# Patient Record
Sex: FEMALE | Race: WHITE | NOT HISPANIC OR LATINO | URBAN - METROPOLITAN AREA
[De-identification: names, ages, dates, MRNs, and addresses within clinical notes are randomized per-mention and may not be internally consistent; named-entity substitution may affect disease eponyms.]

---

## 2024-11-08 ENCOUNTER — CLINICAL SUPPORT (OUTPATIENT)
Dept: AUDIOLOGY | Facility: CLINIC | Age: 64
End: 2024-11-08
Payer: COMMERCIAL

## 2024-11-08 DIAGNOSIS — H93.13 SUBJECTIVE TINNITUS OF BOTH EARS: ICD-10-CM

## 2024-11-08 DIAGNOSIS — H90.3 SENSORINEURAL HEARING LOSS (SNHL) OF BOTH EARS: Primary | ICD-10-CM

## 2024-11-08 PROCEDURE — 92557 COMPREHENSIVE HEARING TEST: CPT | Performed by: AUDIOLOGIST

## 2024-11-08 PROCEDURE — 92550 TYMPANOMETRY & REFLEX THRESH: CPT | Performed by: AUDIOLOGIST

## 2024-11-08 NOTE — PROGRESS NOTES
Name: Dorota Taylor  YOB: 1960  Age: 63 y.o.    Date of Evaluation:  11/08/2024   Dorota Taylor is seen today for an evaluation of hearing prior to an appointment with Kaylee Thompson CNP.  Dorota reports a history of bilateral, constant bothersome tinnitus that has progressively gotten worse over the last few years. She reports the tinnitus interferes with her daily life and her ability to communicate and understand others. She denies vertigo and ear pain. She reports the tinnitus causes her difficulty hearing.     Evaluation:  Otoscopy revealed clear canals with visible tympanic membranes bilaterally.    Immittance:  Right: Type A middle ear function with normal compliance, peak pressure, and ear canal volume.  Left: Type A middle ear function with normal compliance, peak pressure, and ear canal volume.    Ipsilateral Acoustic Reflexes:  Right: Present 500-2000 Hz. Absent 4000 Hz  Left: Present 500-2000 Hz. Absent 4000 Hz    Behavioral Audiometry:  Right: normal hearing sloping to mild sensorineural hearing loss 125-8000 Hz. Excellent word understanding (96 %) at 60 dB HL.  Left:  normal hearing sloping to mild sensorineural hearing loss 125-8000 Hz. Excellent word understanding (100 %) at 60 dB HL.    Pure tone averages in agreement with speech reception thresholds.    Tinnitus Pitch Match = 11,200 Hz  Tinnitus Loudness Match = 54 dB  Right Threshold at 11,2000 Hz = 30 dB HL  Left Threshold at 11,2000 Hz = 35 dB HL    Results:  Today's results were discussed with the patient indicating normal hearing sloping to a mild sensorineural hearing loss bilaterally. Normal tympanograms and excellent word understanding bilaterally.    Treatment Plan:  Follow-up with referring provider  Retest hearing in conjunction with medical management or if a change in hearing occurs    Time: 7165-1647    Completed by:  Jose Mathews, CCC-A  Licensed Senior Audiologist

## 2024-11-14 ENCOUNTER — APPOINTMENT (OUTPATIENT)
Dept: OTOLARYNGOLOGY | Facility: CLINIC | Age: 64
End: 2024-11-14
Payer: COMMERCIAL

## 2024-11-14 VITALS
SYSTOLIC BLOOD PRESSURE: 140 MMHG | HEIGHT: 62 IN | WEIGHT: 204 LBS | DIASTOLIC BLOOD PRESSURE: 83 MMHG | BODY MASS INDEX: 37.54 KG/M2

## 2024-11-14 DIAGNOSIS — H90.3 SENSORINEURAL HEARING LOSS (SNHL) OF BOTH EARS: Primary | ICD-10-CM

## 2024-11-14 DIAGNOSIS — H93.13 TINNITUS OF BOTH EARS: ICD-10-CM

## 2024-11-14 PROCEDURE — 3008F BODY MASS INDEX DOCD: CPT

## 2024-11-14 PROCEDURE — 1036F TOBACCO NON-USER: CPT

## 2024-11-14 PROCEDURE — 99203 OFFICE O/P NEW LOW 30 MIN: CPT

## 2024-11-14 RX ORDER — VIBEGRON 75 MG/1
75 TABLET, FILM COATED ORAL
COMMUNITY
Start: 2024-11-08 | End: 2025-03-08

## 2024-11-14 RX ORDER — BUPROPION HYDROCHLORIDE 150 MG/1
TABLET ORAL
COMMUNITY
Start: 2024-04-06

## 2024-11-14 NOTE — PROGRESS NOTES
Patient ID: Dorota Taylor is a 63 y.o. female who presents for the evaluation of tinnitus.    PROVIDER IMPRESSIONS:  DIAGNOSES/PROBLEMS:  -Bilateral sensorineural hearing loss  -Bilateral tinnitus    ASSESSMENT:   Dorota Taylor is a pleasant 63 y.o. female who presents with symptoms of bilateral non-pulsatile tinnitus. Based on the clinical information provided, symptoms and clinical exam findings are consistent with bilateral sensorineural hearing loss. Reassurance provided to patient that exam today showed no evidence of acute infection or inflammation in the EAC bilaterally and that TM appears with no evidence of infection, effusion, retraction or perforation bilaterally.  Audiogram reviewed in detail with the patient, which revealed mild bilateral sensorineural hearing loss in frequencies above 500 Hz. The various etiologies of tinnitus were reviewed. I explained that subjective tinnitus is often a result of abnormalities within the auditory system that are without an identifiable cause. Patient was informed that no current treatments are clinically proven to provide a definitive cure for primary tinnitus. We discussed that treatment objectives for primary tinnitus aim to reduce the perceived burden and intensity of tinnitus in order to improve quality of life for the patient.     PLAN:  Patient advised to wear ear hearing protection while in the presence of loud sounds. The patient was also counseled to utilize use tinnitus coping strategies as needed, such as sound apps on a smartphone, utilizing calming noise in the room, running a fan at night, etc.    Patient was educated that hearing aids could provide benefit through amplification of sounds that not only correct hearing loss, but also serve as auditory therapy that may make tinnitus less noticeable. Patient may schedule for hearing aid evaluation, if they have not already done so.  Follow-up: Patient may schedule for follow-up as needed.  Patient is agreeable to this plan, all questions were answered to patient's satisfaction.     Subjective   HPI: Dorota Taylor is a 63 y.o. female who presents for the evaluation of tinnitus in both ears.  The patient states that symptom onset began a many years ago and has gradually worsened in the last 2 yrs. Patient describes tinnitus as a continuous non-pulsatile high-pitched ringing sound.  She reports the tinnitus interferes with her daily life and her ability to communicate and understand others and is distracting. She reports that tinnitus causes her difficulty hearing. When asked about the presence of hearing loss, ear pain, ear fullness/pressure, ear itching, ear drainage, autophony, dizziness or vertigo, she admits to bilateral ear fullness. When asked about pertinent otologic history, the patient denies a history of recurrent ear infections, denies history of ear surgery, denies history of PE tube insertion, and denies history of prolonged/traumatic loud noise exposure. The patient does insert Q-tips in the ear canals, and  denies insertion of other foreign objects into the ear canals. The patient denies history of wearing hearing aid devices. The patient does not endorse a family history of hearing loss.       PATIENT HISTORY:  No past medical history on file.   No past surgical history on file.   Not on File   No current outpatient medications on file.   Tobacco Use: Unknown (4/9/2024)    Received from [facility]    Patient History     Smoking Tobacco Use: Never     Smokeless Tobacco Use: Unknown     Passive Exposure: Not on file      Alcohol Use: Not on file      Social History     Substance and Sexual Activity   Drug Use Not on file        Review of Systems   All other systems negative.     Objective   There were no vitals taken for this visit.     PHYSICAL EXAM:  General appearance: Appears well, well-nourished, well groomed. No acute distress.   Constitutional: No fever, chills, weight  loss or weight gain.  Communication: Normal communication  Psychiatric: Oriented to person, place and time. Normal mood and affect.  Neurologic: Cranial nerves II-XII grossly intact and symmetric bilaterally.  Cardiovascular: Examination of peripheral vascular system shows no clubbing or cyanosis.  Respiratory: No respiratory distress increased work of breathing. Inspection of the chest with symmetric chest expansion and normal respiratory effort.  Skin: No head and neck rashes.  Head: Normocephalic. Atraumatic with no masses, lesions or scarring.  Face: Normal symmetry. No scars or deformities.  Eyes: Conjunctiva not edematous or erythematous. PERRLA  Neck: Supple and symmetric, trachea midline. Lymph nodes with no adenopathy.  Head: Normocephalic. Atraumatic with no masses, lesions or scarring.  Eyes: PERRL, EOMI, Conjunctiva is clear. No nystagmus.  Nose: External inspection of nose: No nasal lesions, lacerations or scars. No tenderness on frontal or maxillary sinus palpation. Anterior rhinoscopy with limited visualization past the inferior turbinates: Septum is midline.  No septal perforation or lesions. No septal hematoma/ seroma.  No signs of bleeding.  No evidence of intranasal polyps.     Throat:  Floor of mouth is clear, no masses.  Tongue appears normal, no lesions or masses. Gums, gingiva, buccal mucosa appear pink and moist, no lesions. Teeth are in intact.  No obvious dental infections.  Peritonsillar regions appear symmetric without swelling.  Hard and soft palate appear normal, no obvious cleft. Uvula is midline.  Left Tonsil -- 2+, no exudates.  Right Tonsil -- 2+, no exudates.  Oropharynx: No lesions. Retropharyngeal wall is flat.  []postnasal drip.  Salivary Glands: Symmetric bilaterally.  No palpable masses.  No evidence of acute infection or salivary stones.  TMJ: Mild TMJ crepitus on jaw-opening with bimanual palpation, no trismus.  Right Ear: External inspection of ear with no deformity, scars,  or masses. Mastoid is nontender. External auditory canal is clear.  TM is intact with no sign of infection, effusion, or retraction.  No perforation seen. Auto insufflation visible under microscopy.  Left Ear: External inspection of ear with no deformity, scars, or masses. Mastoid is nontender. External auditory canal is clear.  TM is intact with no sign of infection, effusion, or retraction.  No perforation seen. Auto insufflation visible under microscopy.    RESULTS:  I personally reviewed the patient's audiogram from  11/8/24 , which revealed the following results: Normal hearing in both ears through 500 Hz, sloping to a mild sensorineural hearing loss above. Normal tympanogram bilaterally. Excellent WRS bilaterally. Acoustic reflexes present right ipsilateral, and present left ipsilateral.    Kristin Thompson, APRN-CNP

## 2025-03-11 ENCOUNTER — OFFICE VISIT (OUTPATIENT)
Dept: URGENT CARE | Age: 65
End: 2025-03-11
Payer: COMMERCIAL

## 2025-03-11 VITALS
BODY MASS INDEX: 37.76 KG/M2 | WEIGHT: 200 LBS | DIASTOLIC BLOOD PRESSURE: 74 MMHG | RESPIRATION RATE: 20 BRPM | HEIGHT: 61 IN | SYSTOLIC BLOOD PRESSURE: 131 MMHG | TEMPERATURE: 98.5 F | HEART RATE: 55 BPM | OXYGEN SATURATION: 97 %

## 2025-03-11 DIAGNOSIS — J03.90 ACUTE TONSILLITIS, UNSPECIFIED ETIOLOGY: ICD-10-CM

## 2025-03-11 DIAGNOSIS — J02.9 SORE THROAT: ICD-10-CM

## 2025-03-11 DIAGNOSIS — J06.9 VIRAL UPPER RESPIRATORY TRACT INFECTION: ICD-10-CM

## 2025-03-11 DIAGNOSIS — H65.191 OTHER NON-RECURRENT ACUTE NONSUPPURATIVE OTITIS MEDIA OF RIGHT EAR: Primary | ICD-10-CM

## 2025-03-11 DIAGNOSIS — Z20.822 LAB TEST NEGATIVE FOR COVID-19 VIRUS: ICD-10-CM

## 2025-03-11 LAB
POC BINAX EXPIRATION: NORMAL
POC BINAX NOW COVID SERIAL NUMBER: NORMAL
POC RAPID INFLUENZA A: NEGATIVE
POC RAPID INFLUENZA B: NEGATIVE
POC RAPID STREP: NEGATIVE
POC SARS-COV-2 AG BINAX: NORMAL

## 2025-03-11 RX ORDER — BUPROPION HYDROCHLORIDE 150 MG/1
1 TABLET, EXTENDED RELEASE ORAL 2 TIMES DAILY
COMMUNITY

## 2025-03-11 RX ORDER — VIBEGRON 75 MG/1
1 TABLET, FILM COATED ORAL
COMMUNITY
Start: 2025-02-17

## 2025-03-11 RX ORDER — AZITHROMYCIN 250 MG/1
TABLET, FILM COATED ORAL
Qty: 6 TABLET | Refills: 0 | Status: SHIPPED | OUTPATIENT
Start: 2025-03-11

## 2025-03-11 RX ORDER — BENZONATATE 200 MG/1
200 CAPSULE ORAL 3 TIMES DAILY PRN
Qty: 30 CAPSULE | Refills: 0 | Status: SHIPPED | OUTPATIENT
Start: 2025-03-11 | End: 2025-09-07

## 2025-03-11 ASSESSMENT — PAIN SCALES - GENERAL: PAINLEVEL_OUTOF10: 7

## 2025-03-11 ASSESSMENT — ENCOUNTER SYMPTOMS
FATIGUE: 1
RHINORRHEA: 1
SHORTNESS OF BREATH: 0
COUGH: 1
GASTROINTESTINAL NEGATIVE: 1
WHEEZING: 0
SORE THROAT: 1
TROUBLE SWALLOWING: 0

## 2025-03-11 NOTE — PATIENT INSTRUCTIONS
"You are being treated with antibiotics for tonsillitis and an ear infection. Finish all antibiotics even if you start to feel better.     Tricks to manage your symptoms:   -Drink plenty of water to stay hydrated.  -Get plenty of rest.   -Ibuprofen (Advil® or Motrin®) or acetaminophen (Tylenol®) may be used for temperature and/or discomfort.  Do not exceed the recommended daily amount as written on the bottle. Do not take ibuprofen or other NSAIDS if you are on a blood thinner.  Please contact your provider.  -You can take a daily non-drowsy allergy medication such as Allergra (Fexofendadine), Zyrtec (Cetirizine) or Claritin (Loratadine) to decrease sinus and nasal inflammation and drainage.  -If you have elevated blood pressure you should avoid products with products that contain phenylephrine or pseudophedrine as these medications can increase your blood pressure.  -Putting a small amount of honey in tea may help with your sore throat and cough.  -Gargle with warm salt water 3 to 4 times each day. Mix 1/2 teaspoon (2.5 grams) salt with a cup (240 mL) of warm water.     -Nasal steroids spray (fluticasone or generic equivalent) 1-2 times daily as needed  -Saline nasal spray  -You can take OTC mucinex (Guaifenesin): Can take up to 1200 mg twice daily.    -Drink plenty of water. Water helps thin the mucus so your sinuses can drain more easily.   -Use a humidifier.  -inhale steam 3 to 4 times a day (for example, sit in the bathroom with the shower running).  -Apply a warm, moist washcloth to your face 3 to 4 times a day, or as directed by your caregiver.    SEEK FURTHER MEDICAL ATTENTION IF:  -Shortness of breath or difficulty breathing  -You are unable to take a deep breath  -Trouble swallowing, trouble breathing, or shortness of breath while lying down  -You have difficulties swallowing  -You have difficulties closing your mouth due to pain. (\"HOT POTATO MOUTH\")  -You have chest pain   -You have heart " palpitations  -You have fever > 102 F despite fever reducing medications   -You are unable to tolerate fluids for 6 hours or more  -You develop swelling and/or pain in your legs   -You feel faint, lightheaded, or dizzy  -Any other concerning symptoms    -If your symptoms are not improving over the next 36 hours, please return to the clinic or see your PCP. If your symptoms worsen or you develop shortness of breath, seek emergency care immediately.     Your blood pressure was elevated, please follow up with primary care provider.   If you are not improving or your symptoms worsen go to the ER immediately for evaluation.

## 2025-03-11 NOTE — PROGRESS NOTES
"Subjective   Patient ID: Dorota Taylor is a 64 y.o. female. They present today with a chief complaint of Illness (Earache and sore throat since saturday).    History of Present Illness  -c/o of right ear pain and sore throat since Saturday  -also has cough, body aches  -denies CP, SOB, difficulty swallowing            Past Medical History  Allergies as of 03/11/2025 - Reviewed 03/11/2025   Allergen Reaction Noted    Penicillins Anaphylaxis, Hives, Unknown, and Shortness of breath 06/03/2015       (Not in a hospital admission)       No past medical history on file.    No past surgical history on file.     reports that she has never smoked. She has never used smokeless tobacco.    Review of Systems  Review of Systems   Constitutional:  Positive for fatigue.   HENT:  Positive for ear pain, postnasal drip, rhinorrhea and sore throat. Negative for trouble swallowing.    Respiratory:  Positive for cough. Negative for shortness of breath and wheezing.    Cardiovascular:  Negative for chest pain.   Gastrointestinal: Negative.    All other systems reviewed and are negative.    Objective    Vitals:    03/11/25 1217   BP: 131/74   Pulse: 55   Resp: 20   Temp: 36.9 °C (98.5 °F)   TempSrc: Oral   SpO2: 97%   Weight: 90.7 kg (200 lb)   Height: 1.549 m (5' 1\")     No LMP recorded.    Physical Exam  GEN: Alert, cooperative, NAD, Vitals Reviewed.   SKIN:  No suspicious rashes.  ENT: Right TMs +erythema and bulging, no effusion, TM intact. L TM midly injected.  B/l ear canals unremarkable. No mastoid tenderness. + nasal congestion. No sinus tenderness to fingertap. Oropharyngeal erythema, b/l tonsils hyperemic and significantly swollen, scant exudates. .  + post nasal drip.  Uvula midline. No uvula swelling.  No Cervical LAD.  No submental tenderness.  No evidence of PTA. No mastoid tenderness.   ROM intact.  No thyroid masses.  No trismus. Neck supple.  Trachea midline.  No meningeal signs.  RESP: Unlabored, no increased " WOB, no accessory muscle use, no wheezing, rhonchi, or rales appreciated.   CARDS: RRR, no m/g/r    Procedures    Point of Care Test & Imaging Results from this visit  Results for orders placed or performed in visit on 03/11/25   POCT Covid-19 Rapid Antigen   Result Value Ref Range    Binax NOW Covid Serial Number -     BINAX NOW Covid Expiration -     POC STEPHEN-COV-2 AG  Presumptive negative test for SARS-CoV-2 (no antigen detected)     Presumptive negative test for SARS-CoV-2 (no antigen detected)   POCT Influenza A/B manually resulted   Result Value Ref Range    POC Rapid Influenza A Negative Negative    POC Rapid Influenza B Negative Negative   POCT rapid strep A manually resulted   Result Value Ref Range    POC Rapid Strep Negative Negative      No results found.    Diagnostic study results (if any) were reviewed by Karen Michael PA-C.    Assessment/Plan   Allergies, medications, history, and pertinent labs/EKGs/Imaging reviewed by Karen Michael PA-C.     Medical Decision Making  Rapid strep negative. Rapid COVID negative. Rapid Flu A/B negative.  Clinical exam concerning for tonsillitis without evidence of peritonsillar abscess, deep neck infection or sepsis, also evidence of acute otitis media of the right ear.  Will start on azithromycin (PCN allergy) which will cover both tonsillitis and otitis media..  Encouraged continue symptom management with push fluids, rest, gargle warm salt water, use vaporizer or mist prn, apply heat to sinuses prn, and return office visit prn if symptoms persist or worsen anti-histamines, anti-tussives, anti-mucolytics, and/or flonase etc..  Risks, benefits, and alternatives of the medications and treatment plan prescribed today were discussed, and patient expressed understanding. Plan follow up as discussed or as needed if any worsening symptoms or change in condition. Reinforced red flags including (but not limited to): severe or worsening pain; difficulty swallowing; stiff  neck; shortness of breath; coughing or vomiting blood; chest pain; and new or increased fever are indications to go to the Emergency Department.  Advised to follow up with PCP regarding elevated blood pressure.     At time of discharge patient was clinically well-appearing and HDS for outpatient management. The patient and/or family was educated regarding diagnosis, supportive care, OTC and Rx medications. The patient and/or family was given the opportunity to ask questions prior to discharge.  They verbalized understanding of my discussion of the plans for treatment, expected course, indications to return to  or seek further evaluation in ED, and the need for timely follow up as directed.   They were provided with a work/school excuse if requested.  AVS provided to patient.  All questions were answered and the patient verbalized understanding of the plan of care for today.        Orders and Diagnoses  Diagnoses and all orders for this visit:  Other non-recurrent acute nonsuppurative otitis media of right ear  -     azithromycin (Zithromax) 250 mg tablet; Take 2 tablets (500 mg) by mouth day 1, and take 1 tablet (250 mg) by mouth daily on day 2-5  Viral upper respiratory tract infection  -     benzonatate (Tessalon) 200 mg capsule; Take 1 capsule (200 mg) by mouth 3 times a day as needed for cough. Do not crush or chew.  Acute tonsillitis, unspecified etiology  -     azithromycin (Zithromax) 250 mg tablet; Take 2 tablets (500 mg) by mouth day 1, and take 1 tablet (250 mg) by mouth daily on day 2-5  Sore throat  -     POCT Covid-19 Rapid Antigen  -     POCT Influenza A/B manually resulted  -     POCT rapid strep A manually resulted  Lab test negative for COVID-19 virus      Medical Admin Record      Patient disposition: Home    Electronically signed by Karen Michael PA-C  2:04 PM

## 2025-03-11 NOTE — LETTER
March 11, 2025     Patient: Dorota Taylor   YOB: 1960   Date of Visit: 3/11/2025       To Whom It May Concern:    Dorota Taylor was seen in my clinic on 3/11/2025 at 11:45 am. Please excuse Dorota for her absence from work on this day to make the appointment. May return to work on 3/13/2025.    If you have any questions or concerns, please don't hesitate to call.         Sincerely,         Karen Michael PA-C        CC: No Recipients

## 2025-03-11 NOTE — LETTER
March 11, 2025     Patient: Dorota Taylor   YOB: 1960   Date of Visit: 3/11/2025       To Whom It May Concern:    Dorota Taylor was seen in my clinic on 3/11/2025 at 11:45 am. Please excuse Dorota for her absence from work on this day to make the appointment. May return to work on 3/13/2025.    If you have any questions or concerns, please don't hesitate to call.         Sincerely,         Karen Michael PA-C        CC: No Recipients   Patient is here to establish care.  Patient has no complaints today.  She would like to order an annual labs.